# Patient Record
Sex: FEMALE | Race: WHITE | NOT HISPANIC OR LATINO | Employment: OTHER | URBAN - METROPOLITAN AREA
[De-identification: names, ages, dates, MRNs, and addresses within clinical notes are randomized per-mention and may not be internally consistent; named-entity substitution may affect disease eponyms.]

---

## 2019-07-30 ENCOUNTER — OFFICE VISIT (OUTPATIENT)
Dept: URGENT CARE | Facility: CLINIC | Age: 74
End: 2019-07-30
Payer: COMMERCIAL

## 2019-07-30 VITALS
TEMPERATURE: 97 F | HEIGHT: 62 IN | RESPIRATION RATE: 16 BRPM | BODY MASS INDEX: 27.6 KG/M2 | SYSTOLIC BLOOD PRESSURE: 131 MMHG | DIASTOLIC BLOOD PRESSURE: 81 MMHG | OXYGEN SATURATION: 95 % | HEART RATE: 94 BPM | WEIGHT: 150 LBS

## 2019-07-30 DIAGNOSIS — R26.89 NEURODEGENERATIVE GAIT DISORDER: ICD-10-CM

## 2019-07-30 DIAGNOSIS — Z11.1 SCREENING-PULMONARY TB: Primary | ICD-10-CM

## 2019-07-30 PROBLEM — R73.01 IFG (IMPAIRED FASTING GLUCOSE): Status: ACTIVE | Noted: 2018-10-01

## 2019-07-30 PROBLEM — R32 ABSENCE OF BLADDER CONTINENCE: Status: ACTIVE | Noted: 2018-10-01

## 2019-07-30 PROBLEM — G93.40 ENCEPHALOPATHY, UNSPECIFIED: Status: ACTIVE | Noted: 2018-09-24

## 2019-07-30 PROCEDURE — 99213 PR OFFICE/OUTPT VISIT, EST, LEVL III, 20-29 MIN: ICD-10-PCS | Mod: S$GLB,,, | Performed by: FAMILY MEDICINE

## 2019-07-30 PROCEDURE — 71046 XR CHEST PA AND LATERAL: ICD-10-PCS | Mod: FY,S$GLB,, | Performed by: RADIOLOGY

## 2019-07-30 PROCEDURE — 71046 X-RAY EXAM CHEST 2 VIEWS: CPT | Mod: FY,S$GLB,, | Performed by: RADIOLOGY

## 2019-07-30 PROCEDURE — 99213 OFFICE O/P EST LOW 20 MIN: CPT | Mod: S$GLB,,, | Performed by: FAMILY MEDICINE

## 2019-07-30 RX ORDER — VIT C/E/ZN/COPPR/LUTEIN/ZEAXAN 250MG-90MG
CAPSULE ORAL
COMMUNITY
Start: 2017-03-20

## 2019-07-30 RX ORDER — ASPIRIN 81 MG/1
TABLET ORAL
COMMUNITY
Start: 2017-06-19 | End: 2020-03-09

## 2019-07-30 RX ORDER — ATORVASTATIN CALCIUM 40 MG/1
40 TABLET, FILM COATED ORAL
COMMUNITY
Start: 2019-06-20

## 2019-07-30 NOTE — PROGRESS NOTES
"Subjective:       Patient ID: Naya Araujo is a 73 y.o. female.    Vitals:  height is 5' 2" (1.575 m) and weight is 68 kg (150 lb). Her temperature is 97.3 °F (36.3 °C). Her blood pressure is 131/81 and her pulse is 94. Her respiration is 16 and oxygen saturation is 95%.     Chief Complaint: PPD Reading    Pt is here to get a chest xray so she can enter a nursing home on 8/1      Constitution: Negative for chills, fatigue and fever.   HENT: Negative for congestion and sore throat.    Neck: Negative for painful lymph nodes.   Cardiovascular: Negative for chest pain and leg swelling.   Eyes: Negative for double vision and blurred vision.   Respiratory: Negative for cough and shortness of breath.    Gastrointestinal: Negative for nausea, vomiting and diarrhea.   Genitourinary: Negative for dysuria, frequency, urgency and history of kidney stones.   Musculoskeletal: Negative for joint pain, joint swelling, muscle cramps and muscle ache.   Skin: Negative for color change, pale, rash and bruising.   Allergic/Immunologic: Negative for seasonal allergies.   Neurological: Negative for dizziness, history of vertigo, light-headedness, passing out and headaches.   Hematologic/Lymphatic: Negative for swollen lymph nodes.   Psychiatric/Behavioral: Negative for nervous/anxious, sleep disturbance and depression. The patient is not nervous/anxious.        Objective:      Physical Exam   Constitutional: She appears well-developed and well-nourished.   HENT:   Head: Normocephalic and atraumatic.   Cardiovascular: Normal rate, regular rhythm and normal heart sounds.   Pulmonary/Chest: Effort normal and breath sounds normal.   Neurological: She is alert. Coordination normal.   Wide based gait   Skin: Skin is warm and dry.   Psychiatric: She has a normal mood and affect. Her behavior is normal.   Dementia.   Nursing note and vitals reviewed.      Assessment:       1. Screening-pulmonary TB    2. Neurodegenerative gait disorder      "   Plan:         Screening-pulmonary TB  -     XR CHEST PA AND LATERAL; Future; Expected date: 07/30/2019    Neurodegenerative gait disorder

## 2019-12-13 ENCOUNTER — OFFICE VISIT (OUTPATIENT)
Dept: UROGYNECOLOGY | Facility: CLINIC | Age: 74
End: 2019-12-13
Payer: COMMERCIAL

## 2019-12-13 ENCOUNTER — OFFICE VISIT (OUTPATIENT)
Dept: UROLOGY | Facility: CLINIC | Age: 74
End: 2019-12-13
Payer: MEDICARE

## 2019-12-13 VITALS
BODY MASS INDEX: 28.71 KG/M2 | WEIGHT: 156 LBS | SYSTOLIC BLOOD PRESSURE: 110 MMHG | DIASTOLIC BLOOD PRESSURE: 62 MMHG | HEIGHT: 62 IN

## 2019-12-13 VITALS
DIASTOLIC BLOOD PRESSURE: 80 MMHG | SYSTOLIC BLOOD PRESSURE: 163 MMHG | HEIGHT: 62 IN | BODY MASS INDEX: 28.71 KG/M2 | HEART RATE: 98 BPM | WEIGHT: 156 LBS

## 2019-12-13 DIAGNOSIS — R35.0 URINARY FREQUENCY: ICD-10-CM

## 2019-12-13 DIAGNOSIS — N39.41 URGE INCONTINENCE OF URINE: Primary | ICD-10-CM

## 2019-12-13 DIAGNOSIS — N39.41 URGENCY INCONTINENCE: Primary | ICD-10-CM

## 2019-12-13 PROCEDURE — 1159F PR MEDICATION LIST DOCUMENTED IN MEDICAL RECORD: ICD-10-PCS | Mod: S$GLB,,, | Performed by: UROLOGY

## 2019-12-13 PROCEDURE — 1101F PT FALLS ASSESS-DOCD LE1/YR: CPT | Mod: CPTII,S$GLB,, | Performed by: OBSTETRICS & GYNECOLOGY

## 2019-12-13 PROCEDURE — 1101F PR PT FALLS ASSESS DOC 0-1 FALLS W/OUT INJ PAST YR: ICD-10-PCS | Mod: CPTII,S$GLB,, | Performed by: OBSTETRICS & GYNECOLOGY

## 2019-12-13 PROCEDURE — 99212 OFFICE O/P EST SF 10 MIN: CPT | Mod: S$GLB,,, | Performed by: OBSTETRICS & GYNECOLOGY

## 2019-12-13 PROCEDURE — 99999 PR PBB SHADOW E&M-EST. PATIENT-LVL II: ICD-10-PCS | Mod: PBBFAC,,, | Performed by: OBSTETRICS & GYNECOLOGY

## 2019-12-13 PROCEDURE — 99203 OFFICE O/P NEW LOW 30 MIN: CPT | Mod: S$GLB,,, | Performed by: UROLOGY

## 2019-12-13 PROCEDURE — 1159F PR MEDICATION LIST DOCUMENTED IN MEDICAL RECORD: ICD-10-PCS | Mod: S$GLB,,, | Performed by: OBSTETRICS & GYNECOLOGY

## 2019-12-13 PROCEDURE — 1101F PT FALLS ASSESS-DOCD LE1/YR: CPT | Mod: CPTII,S$GLB,, | Performed by: UROLOGY

## 2019-12-13 PROCEDURE — 99203 PR OFFICE/OUTPT VISIT, NEW, LEVL III, 30-44 MIN: ICD-10-PCS | Mod: S$GLB,,, | Performed by: UROLOGY

## 2019-12-13 PROCEDURE — 99999 PR PBB SHADOW E&M-EST. PATIENT-LVL II: CPT | Mod: PBBFAC,,, | Performed by: OBSTETRICS & GYNECOLOGY

## 2019-12-13 PROCEDURE — 1126F PR PAIN SEVERITY QUANTIFIED, NO PAIN PRESENT: ICD-10-PCS | Mod: S$GLB,,, | Performed by: UROLOGY

## 2019-12-13 PROCEDURE — 1126F AMNT PAIN NOTED NONE PRSNT: CPT | Mod: S$GLB,,, | Performed by: UROLOGY

## 2019-12-13 PROCEDURE — 1101F PR PT FALLS ASSESS DOC 0-1 FALLS W/OUT INJ PAST YR: ICD-10-PCS | Mod: CPTII,S$GLB,, | Performed by: UROLOGY

## 2019-12-13 PROCEDURE — 99212 PR OFFICE/OUTPT VISIT, EST, LEVL II, 10-19 MIN: ICD-10-PCS | Mod: S$GLB,,, | Performed by: OBSTETRICS & GYNECOLOGY

## 2019-12-13 PROCEDURE — 1159F MED LIST DOCD IN RCRD: CPT | Mod: S$GLB,,, | Performed by: UROLOGY

## 2019-12-13 PROCEDURE — 1159F MED LIST DOCD IN RCRD: CPT | Mod: S$GLB,,, | Performed by: OBSTETRICS & GYNECOLOGY

## 2019-12-13 NOTE — PROGRESS NOTES
Subjective:      Patient ID: Naya Araujo is a 74 y.o. female.    Chief Complaint:  Consult (re.est care for Interstiem device )      History of Present Illness  This is a patient that I must have about 8-10 years ago placed to InterStim 2 device for the purpose of sacral neuromodulation.  At that.  Since this is the when I was just starting to do this procedure I know that I would always do a 2 stage procedure in that she would have had to have resolving success for me to move forward with battery pack implantation.  At the time that I saw her her  was battling cancer and it was his purpose in life to ensure the well-being of his wife.  The patient's the son is in neuro surgeon now I believe at [facility].  She states that she has been doing very well up until 2 years ago she has been very busy and wants to once again to get the control backed that the implant had given her.  Discussed this at length.  I wrote reveal to the patient that I have not been doing these for close to 7 years and I do not think my skill set is at the point that she needs but I am going to refer her bring her to a colleague who is very proficient with high high skill in this patient appreciated the time    Patient states she has no control of urination has frequent accidents          Past Medical History:   Diagnosis Date    Hyperlipidemia        No past surgical history on file.    GYN & OB History  No LMP recorded.   Date of Last Pap: No result found    OB History   No data available       Health Maintenance       Date Due Completion Date    Hepatitis C Screening 1945 ---    Lipid Panel 1945 ---    TETANUS VACCINE 09/02/1965 ---    Mammogram 09/02/1987 ---    DEXA SCAN 09/02/1987 ---    Shingles Vaccine (1 of 2) 09/02/1997 ---    Colonoscopy 09/02/1997 ---    Pneumococcal Vaccine (65+ Low/Medium Risk) (1 of 2 - PCV13) 09/02/2012 ---    Influenza Vaccine (1) 09/01/2019 ---          No family history on file.    Social History  "    Socioeconomic History    Marital status:      Spouse name: Not on file    Number of children: Not on file    Years of education: Not on file    Highest education level: Not on file   Occupational History    Not on file   Social Needs    Financial resource strain: Not on file    Food insecurity:     Worry: Not on file     Inability: Not on file    Transportation needs:     Medical: Not on file     Non-medical: Not on file   Tobacco Use    Smoking status: Never Smoker    Smokeless tobacco: Never Used   Substance and Sexual Activity    Alcohol use: Yes    Drug use: Never    Sexual activity: Not on file   Lifestyle    Physical activity:     Days per week: Not on file     Minutes per session: Not on file    Stress: Not on file   Relationships    Social connections:     Talks on phone: Not on file     Gets together: Not on file     Attends Anabaptist service: Not on file     Active member of club or organization: Not on file     Attends meetings of clubs or organizations: Not on file     Relationship status: Not on file   Other Topics Concern    Not on file   Social History Narrative    Not on file       Review of Systems  Review of Systems  Profound incontinence and frequency     Objective:   /62 (BP Location: Right arm, Patient Position: Sitting)   Ht 5' 2" (1.575 m)   Wt 70.8 kg (156 lb)   BMI 28.53 kg/m²     Physical Exam   The deferred  Assessment:     1. Urge incontinence of urine    2. Urinary frequency            Plan:     1. Urge incontinence of urine    2. Urinary frequency      I am referring this patient to the Urology for most likely either battery pack replacement or lead replacement or both.  I am going to walk the patient upstairs myself present the patient to the provider as well.  There are no Patient Instructions on file for this visit.      "

## 2019-12-13 NOTE — LETTER
December 13, 2019      Chandler Feldman MD  [address] [place] [address]  [address]  [facility] [address]           [facility] [address] [address]  Phone: [phone]  Fax: [phone]          Patient: Naya Araujo   MR Number: [id number]   YOB: 1945   Date of Visit: 12/13/2019       Dear Dr. Chandler Feldman:    Thank you for referring Naya Araujo to me for evaluation. Attached you will find relevant portions of my assessment and plan of care.    If you have questions, please do not hesitate to call me. I look forward to following Naya Araujo along with you.    Sincerely,    John Traore MD    Enclosure  CC:  No Recipients    If you would like to receive this communication electronically, please contact [email or web address] or [phone] to request more information on [employer] Link access.    For providers and/or their staff who would like to refer a patient to [facility], please contact us through our one-stop-shop provider referral line, [facility], at [phone].    If you feel you have received this communication in error or would no longer like to receive these types of communications, please e-mail [email or web address]

## 2019-12-13 NOTE — PROGRESS NOTES
New Overactive Bladder Note      Subjective:         Chief Complaint:  Non-functioning SNM Device; Referred by Dr. Feldman        History of Present Illness  This is a very pleasant 74 year-old woman referred by Dr. Feldman for a non-functioning SNM device implanted about 9-10 years ago. This was placed in a staged fashion and she reports considerable improvement in her symptoms until about 2 years ago, when she had return of symptoms. She has not been able to communicate with the device.     She now reports voiding 4-5x daily and 1-2x at night. However, she reports at least 4 UUI episodes daily with use of 4 pads per day which are heavily saturated. She used only one pad daily prior to 1-2 years ago.                    Past Medical History:   Diagnosis Date    Hyperlipidemia           No past surgical history on file.     GYN & OB History  No LMP recorded.   Date of Last Pap: No result found     OB History   No data available                Health Maintenance        Date Due Completion Date     Hepatitis C Screening 1945 ---     Lipid Panel 1945 ---     TETANUS VACCINE 09/02/1965 ---     Mammogram 09/02/1987 ---     DEXA SCAN 09/02/1987 ---     Shingles Vaccine (1 of 2) 09/02/1997 ---     Colonoscopy 09/02/1997 ---     Pneumococcal Vaccine (65+ Low/Medium Risk) (1 of 2 - PCV13) 09/02/2012 ---     Influenza Vaccine (1) 09/01/2019 ---             No family history on file.     Social History            Socioeconomic History    Marital status:        Spouse name: Not on file    Number of children: Not on file    Years of education: Not on file    Highest education level: Not on file   Occupational History    Not on file   Social Needs    Financial resource strain: Not on file    Food insecurity:       Worry: Not on file       Inability: Not on file    Transportation needs:       Medical: Not on file       Non-medical: Not on file   Tobacco Use    Smoking status: Never Smoker     "Smokeless tobacco: Never Used   Substance and Sexual Activity    Alcohol use: Yes    Drug use: Never    Sexual activity: Not on file   Lifestyle    Physical activity:       Days per week: Not on file       Minutes per session: Not on file    Stress: Not on file   Relationships    Social connections:       Talks on phone: Not on file       Gets together: Not on file       Attends Spiritism service: Not on file       Active member of club or organization: Not on file       Attends meetings of clubs or organizations: Not on file       Relationship status: Not on file   Other Topics Concern    Not on file   Social History Narrative    Not on file         Review of Systems  Review of Systems  Profound incontinence and frequency     Objective:   /62 (BP Location: Right arm, Patient Position: Sitting)   Ht 5' 2" (1.575 m)   Wt 70.8 kg (156 lb)   BMI 28.53 kg/m²      Physical Exam      Const: no acute distress, conversant and alert  Eyes: anicteric, extraocular muscles intact  ENMT: normocephalic, Nl oral membranes  Cardio: no cyanosis, nl cap refill  Pulm: no tachypnea; no resp distress  Abd: soft, no tenderness  Musc: no laceration, no tenderness  Neuro: alert; oriented x 3  Skin: warm, dry; no petichiae  Psych: no anxiety; normal speech         Assessment:      1. Urgency Incontinence: Patient brought up by Dr. Feldman today who reviewed her history and patient was able to fill me in on details of her current symptoms. She did not have her  with her today but reports no sensation and not being able to communicate with her device. Given the device was placed nearly 10 years ago, would suspect that her IPG has fully depleted. Would begin with IPG replacement as we can do this under a local anesthetic and she had adequate response for 8 years after placment.       "

## 2020-01-13 ENCOUNTER — TELEPHONE (OUTPATIENT)
Dept: UROGYNECOLOGY | Facility: CLINIC | Age: 75
End: 2020-01-13

## 2020-01-13 NOTE — TELEPHONE ENCOUNTER
----- Message from Jackie Dawkins sent at 1/13/2020  9:20 AM CST -----  Contact: VICKY FRANK [[id number]]  Contact: VICKY FRANK [[id number]]    What is the request in detail:   Requesting a call in regards to a referral the doctor he wanted her to see she states she needs the name and office # for the clinic      Can the clinic reply by [facility]:  No      What Number to Call Back if not in [facility]:   [phone]

## 2020-01-23 ENCOUNTER — TELEPHONE (OUTPATIENT)
Dept: UROGYNECOLOGY | Facility: CLINIC | Age: 75
End: 2020-01-23

## 2020-01-23 NOTE — TELEPHONE ENCOUNTER
----- Message from Radha Vasquez sent at 1/23/2020  1:28 PM CST -----  Contact: VICKY FRANK [[id number]]  Name of Who is Calling : VICKY FRANK [[id number]]    Patient is requesting a call from staff in regards to referral to a provider she was given by dr. gomez    .....Please contact to further discuss and advise.    Can the clinic reply by [facility] : No    What Number to Call Back :  [phone]

## 2020-01-23 NOTE — TELEPHONE ENCOUNTER
Spoke to pt. And provided her with Dr. John Traore's name and #[phone].pt. Verbalized understanding

## 2020-01-24 ENCOUNTER — TELEPHONE (OUTPATIENT)
Dept: UROGYNECOLOGY | Facility: CLINIC | Age: 75
End: 2020-01-24

## 2020-01-24 NOTE — TELEPHONE ENCOUNTER
----- Message from Alise Patton sent at 1/24/2020 12:53 PM CST -----  Contact: VICKY FRANK   Name of Who is Calling: VICKY FRANK       What is the request in detail: patient is requesting a call back concerning a doctor he referred her to she has some questions       Can the clinic reply by [facility]: no      What Number to Call Back if not in [facility]: [phone]

## 2020-02-13 ENCOUNTER — TELEPHONE (OUTPATIENT)
Dept: UROGYNECOLOGY | Facility: CLINIC | Age: 75
End: 2020-02-13

## 2020-02-13 NOTE — TELEPHONE ENCOUNTER
----- Message from Gallo Arauz, Patient Care Assistant sent at 2/13/2020 11:08 AM CST -----  Contact: Tawnya Director of assisting living  Name of Who is Calling: Tawnya Director of assisting living    What is the request in detail:Requesting a call back in regards of a procedure stating patient had it done years ago and wants to repair device for pelvic stimulator or have another procedure done .  Please contact to further discuss and advise      Can the clinic reply by [facility]: No    What [facility]:   [phone]

## 2020-02-14 ENCOUNTER — TELEPHONE (OUTPATIENT)
Dept: UROGYNECOLOGY | Facility: CLINIC | Age: 75
End: 2020-02-14

## 2020-02-14 NOTE — TELEPHONE ENCOUNTER
----- Message from Shaila Sapp sent at 2/14/2020  9:42 AM CST -----  Contact: Rosa M with Assisting Living  Name of Who is Calling: Rosa M with Assisting Living    What is the request in detail: states this is her second attempt contacting office in regards to  Requesting a call back in regards of a procedure stating patient had it done years ago and wants to repair device for pelvic stimulator or have another procedure done .                             Please contact to further discuss and advise      Can the clinic reply by [facility]: no    What [facility] to [facility] Back [facility]: [phone]

## 2020-03-09 ENCOUNTER — TELEPHONE (OUTPATIENT)
Dept: UROLOGY | Facility: CLINIC | Age: 75
End: 2020-03-09

## 2020-03-09 ENCOUNTER — OFFICE VISIT (OUTPATIENT)
Dept: UROLOGY | Facility: CLINIC | Age: 75
End: 2020-03-09
Payer: MEDICARE

## 2020-03-09 VITALS
SYSTOLIC BLOOD PRESSURE: 141 MMHG | HEART RATE: 79 BPM | DIASTOLIC BLOOD PRESSURE: 82 MMHG | BODY MASS INDEX: 28.71 KG/M2 | HEIGHT: 62 IN | WEIGHT: 156 LBS

## 2020-03-09 DIAGNOSIS — N39.41 URGENCY INCONTINENCE: Primary | ICD-10-CM

## 2020-03-09 DIAGNOSIS — N30.00 ACUTE CYSTITIS WITHOUT HEMATURIA: Primary | ICD-10-CM

## 2020-03-09 PROCEDURE — 1159F MED LIST DOCD IN RCRD: CPT | Mod: S$GLB,,, | Performed by: UROLOGY

## 2020-03-09 PROCEDURE — 1101F PT FALLS ASSESS-DOCD LE1/YR: CPT | Mod: CPTII,S$GLB,, | Performed by: UROLOGY

## 2020-03-09 PROCEDURE — 1101F PR PT FALLS ASSESS DOC 0-1 FALLS W/OUT INJ PAST YR: ICD-10-PCS | Mod: CPTII,S$GLB,, | Performed by: UROLOGY

## 2020-03-09 PROCEDURE — 99999 PR PBB SHADOW E&M-EST. PATIENT-LVL III: ICD-10-PCS | Mod: PBBFAC,,, | Performed by: UROLOGY

## 2020-03-09 PROCEDURE — 87081 CULTURE SCREEN ONLY: CPT | Mod: 59

## 2020-03-09 PROCEDURE — 87186 SC STD MICRODIL/AGAR DIL: CPT | Mod: 59

## 2020-03-09 PROCEDURE — 1126F PR PAIN SEVERITY QUANTIFIED, NO PAIN PRESENT: ICD-10-PCS | Mod: S$GLB,,, | Performed by: UROLOGY

## 2020-03-09 PROCEDURE — 99214 OFFICE O/P EST MOD 30 MIN: CPT | Mod: S$GLB,,, | Performed by: UROLOGY

## 2020-03-09 PROCEDURE — 87088 URINE BACTERIA CULTURE: CPT

## 2020-03-09 PROCEDURE — 1126F AMNT PAIN NOTED NONE PRSNT: CPT | Mod: S$GLB,,, | Performed by: UROLOGY

## 2020-03-09 PROCEDURE — 87086 URINE CULTURE/COLONY COUNT: CPT

## 2020-03-09 PROCEDURE — 87077 CULTURE AEROBIC IDENTIFY: CPT | Mod: 59

## 2020-03-09 PROCEDURE — 99999 PR PBB SHADOW E&M-EST. PATIENT-LVL III: CPT | Mod: PBBFAC,,, | Performed by: UROLOGY

## 2020-03-09 PROCEDURE — 99214 PR OFFICE/OUTPT VISIT, EST, LEVL IV, 30-39 MIN: ICD-10-PCS | Mod: S$GLB,,, | Performed by: UROLOGY

## 2020-03-09 PROCEDURE — 1159F PR MEDICATION LIST DOCUMENTED IN MEDICAL RECORD: ICD-10-PCS | Mod: S$GLB,,, | Performed by: UROLOGY

## 2020-03-09 RX ORDER — SULFAMETHOXAZOLE AND TRIMETHOPRIM 800; 160 MG/1; MG/1
1 TABLET ORAL 2 TIMES DAILY
Qty: 6 TABLET | Refills: 0 | Status: SHIPPED | OUTPATIENT
Start: 2020-03-09 | End: 2020-03-12

## 2020-03-09 NOTE — LETTER
March 9, 2020      Yaz Anglin MD  [address]   [address]  [facility] [address]           [facility] - [facility] 4th Floor  [address]  [place] [address]  Phone: [phone]          Patient: Naya Araujo   MR Number: [id number]   YOB: 1945   Date of Visit: 3/9/2020       Dear Dr. Yaz Anglin:    Thank you for referring Naya Araujo to me for evaluation. Attached you will find relevant portions of my assessment and plan of care.    If you have questions, please do not hesitate to call me. I look forward to following Naya Araujo along with you.    Sincerely,    John Traore MD    Enclosure  CC:  No Recipients    If you would like to receive this communication electronically, please contact [email or web address] or [phone] to request more information on [employer] Link access.    For providers and/or their staff who would like to refer a patient to [facility], please contact us through our one-stop-shop provider referral line, [facility], at [phone].    If you feel you have received this communication in error or would no longer like to receive these types of communications, please e-mail [email or web address]

## 2020-03-09 NOTE — PROGRESS NOTES
New Overactive Bladder Note        Subjective:         Chief Complaint:  Non-functioning SNM Device; Referred by Dr. Feldman        History of Present Illness  This is a very pleasant 74 year-old woman referred by Dr. Feldman for a non-functioning SNM device implanted about 9-10 years ago. This was placed in a staged fashion and she reports considerable improvement in her symptoms until about 2 years ago, when she had return of symptoms. She has not been able to communicate with the device.      She now reports voiding 4-5x daily and 1-2x at night. However, she reports at least 4 UUI episodes daily with use of 4 pads per day which are heavily saturated. She used only one pad daily prior to 1-2 years ago.                       Past Medical History:   Diagnosis Date    Hyperlipidemia           No past surgical history on file.     GYN & OB History  No LMP recorded.   Date of Last Pap: No result found     OB History   No data available                     Health Maintenance        Date Due Completion Date      Hepatitis C Screening 1945 ---      Lipid Panel 1945 ---      TETANUS VACCINE 09/02/1965 ---      Mammogram 09/02/1987 ---      DEXA SCAN 09/02/1987 ---      Shingles Vaccine (1 of 2) 09/02/1997 ---      Colonoscopy 09/02/1997 ---      Pneumococcal Vaccine (65+ Low/Medium Risk) (1 of 2 - PCV13) 09/02/2012 ---      Influenza Vaccine (1) 09/01/2019 ---              No family history on file.     Social History                Socioeconomic History    Marital status:        Spouse name: Not on file    Number of children: Not on file    Years of education: Not on file    Highest education level: Not on file   Occupational History    Not on file   Social Needs    Financial resource strain: Not on file    Food insecurity:       Worry: Not on file       Inability: Not on file    Transportation needs:       Medical: Not on file       Non-medical: Not on file   Tobacco Use    Smoking  status: Never Smoker    Smokeless tobacco: Never Used   Substance and Sexual Activity    Alcohol use: Yes    Drug use: Never    Sexual activity: Not on file   Lifestyle    Physical activity:       Days per week: Not on file       Minutes per session: Not on file    Stress: Not on file   Relationships    Social connections:       Talks on phone: Not on file       Gets together: Not on file       Attends Spiritism service: Not on file       Active member of club or organization: Not on file       Attends meetings of clubs or organizations: Not on file       Relationship status: Not on file   Other Topics Concern    Not on file   Social History Narrative    Not on file         Review of Systems  Review of Systems  Profound incontinence and frequency     Physical Exam        Const: no acute distress, conversant and alert  Eyes: anicteric, extraocular muscles intact  ENMT: normocephalic, Nl oral membranes  Cardio: no cyanosis, nl cap refill  Pulm: no tachypnea; no resp distress  Abd: soft, no tenderness  Musc: no laceration, no tenderness  Neuro: alert; oriented x 3  Skin: warm, dry; no petichiae  Psych: no anxiety; normal speech            Assessment:      1. Urgency Incontinence: Patient brought up by Dr. Feldman today who reviewed her history and patient was able to fill me in on details of her current symptoms. She did not have her  with her today but reports no sensation and not being able to communicate with her device. Given the device was placed nearly 10 years ago, would suspect that her IPG has fully depleted. Would begin with IPG replacement as we can do this under a local anesthetic and she had adequate response for 8 years after placment.

## 2020-03-12 LAB
BACTERIA UR CULT: ABNORMAL
BACTERIA UR CULT: ABNORMAL
MRSA SPEC QL CULT: NORMAL

## 2020-09-17 ENCOUNTER — TELEPHONE (OUTPATIENT)
Dept: UROLOGY | Facility: CLINIC | Age: 75
End: 2020-09-17

## 2020-09-17 DIAGNOSIS — N32.81 OAB (OVERACTIVE BLADDER): ICD-10-CM

## 2020-09-17 DIAGNOSIS — N39.41 URGENCY INCONTINENCE: Primary | ICD-10-CM

## 2020-09-17 NOTE — TELEPHONE ENCOUNTER
Spoke with pt son carri to reschedule her procedure, offered 9-30 but the pt son [place] be [place] from [place] and he's unable to make it in until the end of oct. Per pt son request I will be mailing out the instructions and covid screening appointment letter to address given in [place], pt son agreed and verbally understood.

## 2020-10-19 ENCOUNTER — TELEPHONE (OUTPATIENT)
Dept: UROLOGY | Facility: CLINIC | Age: 75
End: 2020-10-19

## 2020-10-19 NOTE — ANESTHESIA PAT ROS NOTE
10/19/2020  Naya Araujo is a 75 y.o., female.      Pre-op Assessment          Review of Systems  Anesthesia Hx:  No problems with previous Anesthesia  Denies Family Hx of Anesthesia complications.   Denies Personal Hx of Anesthesia complications.   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      Shani Ga RN  10/19/20    Anesthesia Assessment: Preoperative EQUATION    Planned Procedure: Procedure(s) (LRB):  REVISION, NEUROSTIMULATOR, SACRAL (N/A)  INSERTION, NEUROSTIMULATOR, PERMANENT, SACRAL (N/A)  Requested Anesthesia Type:General  Surgeon: John Traore MD  Service: Urology  Known or anticipated Date of Surgery:11/10/2020    Surgeon notes: reviewed and Urgency incontinence    Previous anesthesia records:Not available and S/P Hysterectomy-No anesthesia records found in [facility]     Last PCP note: > 1 year ago , outside [facility] , [facility]-X[facility] mo/F/U-[facility].  Subspecialty notes: Urogynecology    Other important co-morbidities: none    Medical History    Diagnosis Date Comment Source   Hyperlipidemia   Provider     Patients' son states patient had Encephalitis in past history.    Tests already available:  Results have been reviewed. CXR-7/30/19      Plan: Phone pending     Testing:  BMP and EKG     Patient  has previously scheduled Medical Appointment:Appointment on 10/24/20, prior to the surgery date.    Navigation: Phone Completed                       Tests Scheduled. Lab-BMP & EKG on 11/6/20 @ 1:15p & 1:40p-Pending--(Updated appt. on 11/4/20).          Consults scheduled. None needed at this time.             Results will be tracked by Preop  "Clinic.    Patient has difficulty doing phone screen-hearing difficulty and her  son -(Dr.P. Araujo) did the preop phone screen.                   Shani Ga RN  10/19/20      Addendum:Patient missed Lab & EKG appt. on 11/3/20 & rescheduled for 11/6/20 @ 1:15p & 1:40p(patients' son-() requesting Friday-11/6/20 appts.)-pending-(reordered BMP & EKG)-pending. Shani Ga RN  11/4/2    Addendum:BMP-done & reviewed on 11/6/20 & EKG done 11/6/20  & "in process"-will need review-pending. Shani Ga RN 11/6/20    Addendum: EKG done on 11/6/20, & reviewed. Shani Ga RN  11/9/20             "

## 2020-10-19 NOTE — TELEPHONE ENCOUNTER
Spoke to patients son carri to advise the pt procedure would have to be rescheduled due to dr cruz being out. The pt son agreed on 11-10, her covid screening would also be rescheduled and the new appt letter will be sent out to the address her son gave me. Pt son verbally understood.

## 2020-11-06 ENCOUNTER — HOSPITAL ENCOUNTER (OUTPATIENT)
Dept: CARDIOLOGY | Facility: CLINIC | Age: 75
Discharge: HOME | End: 2020-11-06
Payer: MEDICARE

## 2020-11-06 DIAGNOSIS — Z01.818 PREOP TESTING: ICD-10-CM

## 2020-11-06 PROCEDURE — 93010 ELECTROCARDIOGRAM REPORT: CPT | Mod: S$GLB,,, | Performed by: INTERNAL MEDICINE

## 2020-11-06 PROCEDURE — 93005 ELECTROCARDIOGRAM TRACING: CPT | Mod: S$GLB,,, | Performed by: ANESTHESIOLOGY

## 2020-11-06 PROCEDURE — 93010 EKG 12-LEAD: ICD-10-PCS | Mod: S$GLB,,, | Performed by: INTERNAL MEDICINE

## 2020-11-06 PROCEDURE — 93005 EKG 12-LEAD: ICD-10-PCS | Mod: S$GLB,,, | Performed by: ANESTHESIOLOGY

## 2020-11-07 ENCOUNTER — LAB VISIT (OUTPATIENT)
Dept: URGENT CARE | Facility: CLINIC | Age: 75
End: 2020-11-07
Payer: MEDICARE

## 2020-11-07 DIAGNOSIS — N32.81 OAB (OVERACTIVE BLADDER): ICD-10-CM

## 2020-11-07 DIAGNOSIS — N39.41 URGENCY INCONTINENCE: ICD-10-CM

## 2020-11-07 PROCEDURE — U0003 INFECTIOUS AGENT DETECTION BY NUCLEIC ACID (DNA OR RNA); SEVERE ACUTE RESPIRATORY SYNDROME CORONAVIRUS 2 (SARS-COV-2) (CORONAVIRUS DISEASE [COVID-19]), AMPLIFIED PROBE TECHNIQUE, MAKING USE OF HIGH THROUGHPUT TECHNOLOGIES AS DESCRIBED BY CMS-2020-01-R: HCPCS

## 2020-11-08 LAB — SARS-COV-2 RNA RESP QL NAA+PROBE: NOT DETECTED

## 2020-11-09 ENCOUNTER — TELEPHONE (OUTPATIENT)
Dept: UROLOGY | Facility: CLINIC | Age: 75
End: 2020-11-09

## 2020-11-09 ENCOUNTER — ANESTHESIA EVENT (OUTPATIENT)
Dept: SURGERY | Facility: HOSPITAL | Age: 75
End: 2020-11-09
Payer: MEDICARE

## 2020-11-09 PROBLEM — N32.81 OVERACTIVE BLADDER: Status: ACTIVE | Noted: 2020-11-09

## 2020-11-09 NOTE — TELEPHONE ENCOUNTER
Called pt and spoke with her son to confirm arrival time of 500am for procedure on 11-10. Gave pt NPO instructions and gave pt opportunity to ask questions. Pt verbalized understanding.    Pt son was informed that only 1 person would be allowed to accompany them the morning of surgery.  Pt son verbalized understanding.

## 2020-11-10 ENCOUNTER — ANESTHESIA (OUTPATIENT)
Dept: SURGERY | Facility: HOSPITAL | Age: 75
End: 2020-11-10
Payer: MEDICARE

## 2020-11-10 ENCOUNTER — HOSPITAL ENCOUNTER (OUTPATIENT)
Facility: HOSPITAL | Age: 75
Discharge: HOME | End: 2020-11-10
Attending: UROLOGY | Admitting: UROLOGY
Payer: MEDICARE

## 2020-11-10 VITALS
SYSTOLIC BLOOD PRESSURE: 150 MMHG | OXYGEN SATURATION: 98 % | TEMPERATURE: 99 F | WEIGHT: 160 LBS | RESPIRATION RATE: 17 BRPM | HEART RATE: 81 BPM | BODY MASS INDEX: 30.21 KG/M2 | DIASTOLIC BLOOD PRESSURE: 69 MMHG | HEIGHT: 61 IN

## 2020-11-10 DIAGNOSIS — N32.81 OVERACTIVE BLADDER: Primary | ICD-10-CM

## 2020-11-10 PROCEDURE — D9220A PRA ANESTHESIA: Mod: CRNA,,, | Performed by: NURSE ANESTHETIST, CERTIFIED REGISTERED

## 2020-11-10 PROCEDURE — 37000008 HC ANESTHESIA 1ST 15 MINUTES: Performed by: UROLOGY

## 2020-11-10 PROCEDURE — 25000003 PHARM REV CODE 250: Performed by: NURSE ANESTHETIST, CERTIFIED REGISTERED

## 2020-11-10 PROCEDURE — 71000015 HC POSTOP RECOV 1ST HR: Performed by: UROLOGY

## 2020-11-10 PROCEDURE — C1767 GENERATOR, NEURO NON-RECHARG: HCPCS | Performed by: UROLOGY

## 2020-11-10 PROCEDURE — 37000009 HC ANESTHESIA EA ADD 15 MINS: Performed by: UROLOGY

## 2020-11-10 PROCEDURE — 95972 PR PRG SPNL GEN CMPLX: ICD-10-PCS | Mod: 59,,, | Performed by: UROLOGY

## 2020-11-10 PROCEDURE — 64590 PR IMPLANT PERIPH/GASTRIC NEUROSTIM/RECEIVER: ICD-10-PCS | Mod: ,,, | Performed by: UROLOGY

## 2020-11-10 PROCEDURE — D9220A PRA ANESTHESIA: ICD-10-PCS | Mod: CRNA,,, | Performed by: NURSE ANESTHETIST, CERTIFIED REGISTERED

## 2020-11-10 PROCEDURE — D9220A PRA ANESTHESIA: ICD-10-PCS | Mod: ANES,,, | Performed by: ANESTHESIOLOGY

## 2020-11-10 PROCEDURE — 63600175 PHARM REV CODE 636 W HCPCS: Performed by: NURSE ANESTHETIST, CERTIFIED REGISTERED

## 2020-11-10 PROCEDURE — 71000044 HC DOSC ROUTINE RECOVERY FIRST HOUR: Performed by: UROLOGY

## 2020-11-10 PROCEDURE — 64590 INS/RPL PRPH SAC/GSTR NPG/R: CPT | Mod: ,,, | Performed by: UROLOGY

## 2020-11-10 PROCEDURE — 95972 ALYS CPLX SP/PN NPGT W/PRGRM: CPT | Mod: 59,,, | Performed by: UROLOGY

## 2020-11-10 PROCEDURE — 36000707: Performed by: UROLOGY

## 2020-11-10 PROCEDURE — D9220A PRA ANESTHESIA: Mod: ANES,,, | Performed by: ANESTHESIOLOGY

## 2020-11-10 PROCEDURE — 25000003 PHARM REV CODE 250: Performed by: UROLOGY

## 2020-11-10 PROCEDURE — 25000003 PHARM REV CODE 250: Performed by: STUDENT IN AN ORGANIZED HEALTH CARE EDUCATION/TRAINING PROGRAM

## 2020-11-10 PROCEDURE — 36000706: Performed by: UROLOGY

## 2020-11-10 PROCEDURE — 63600175 PHARM REV CODE 636 W HCPCS: Performed by: STUDENT IN AN ORGANIZED HEALTH CARE EDUCATION/TRAINING PROGRAM

## 2020-11-10 DEVICE — SYS INTERSTIM X RECHARGE FREE: Type: IMPLANTABLE DEVICE | Site: SACRUM | Status: FUNCTIONAL

## 2020-11-10 RX ORDER — MIDAZOLAM HYDROCHLORIDE 1 MG/ML
INJECTION, SOLUTION INTRAMUSCULAR; INTRAVENOUS
Status: DISCONTINUED | OUTPATIENT
Start: 2020-11-10 | End: 2020-11-10

## 2020-11-10 RX ORDER — OXYCODONE AND ACETAMINOPHEN 5; 325 MG/1; MG/1
1 TABLET ORAL EVERY 6 HOURS PRN
Qty: 5 TABLET | Refills: 0 | Status: SHIPPED | OUTPATIENT
Start: 2020-11-10

## 2020-11-10 RX ORDER — VANCOMYCIN HCL IN 5 % DEXTROSE 1G/250ML
15 PLASTIC BAG, INJECTION (ML) INTRAVENOUS
Status: COMPLETED | OUTPATIENT
Start: 2020-11-10 | End: 2020-11-10

## 2020-11-10 RX ORDER — SODIUM CHLORIDE 9 MG/ML
INJECTION, SOLUTION INTRAVENOUS CONTINUOUS
Status: DISCONTINUED | OUTPATIENT
Start: 2020-11-10 | End: 2020-11-10 | Stop reason: HOSPADM

## 2020-11-10 RX ORDER — PROPOFOL 10 MG/ML
VIAL (ML) INTRAVENOUS CONTINUOUS PRN
Status: DISCONTINUED | OUTPATIENT
Start: 2020-11-10 | End: 2020-11-10

## 2020-11-10 RX ORDER — SODIUM CHLORIDE 0.9 % (FLUSH) 0.9 %
3 SYRINGE (ML) INJECTION
Status: DISCONTINUED | OUTPATIENT
Start: 2020-11-10 | End: 2020-11-10 | Stop reason: HOSPADM

## 2020-11-10 RX ORDER — FENTANYL CITRATE 50 UG/ML
50 INJECTION, SOLUTION INTRAMUSCULAR; INTRAVENOUS EVERY 5 MIN PRN
Status: DISCONTINUED | OUTPATIENT
Start: 2020-11-10 | End: 2020-11-10 | Stop reason: HOSPADM

## 2020-11-10 RX ORDER — FENTANYL CITRATE 50 UG/ML
INJECTION, SOLUTION INTRAMUSCULAR; INTRAVENOUS
Status: DISCONTINUED | OUTPATIENT
Start: 2020-11-10 | End: 2020-11-10

## 2020-11-10 RX ORDER — HYDROMORPHONE HYDROCHLORIDE 1 MG/ML
0.2 INJECTION, SOLUTION INTRAMUSCULAR; INTRAVENOUS; SUBCUTANEOUS EVERY 5 MIN PRN
Status: DISCONTINUED | OUTPATIENT
Start: 2020-11-10 | End: 2020-11-10 | Stop reason: HOSPADM

## 2020-11-10 RX ORDER — SODIUM CHLORIDE 9 MG/ML
INJECTION, SOLUTION INTRAVENOUS CONTINUOUS PRN
Status: DISCONTINUED | OUTPATIENT
Start: 2020-11-10 | End: 2020-11-10

## 2020-11-10 RX ORDER — BUPIVACAINE HYDROCHLORIDE AND EPINEPHRINE 2.5; 5 MG/ML; UG/ML
INJECTION, SOLUTION EPIDURAL; INFILTRATION; INTRACAUDAL; PERINEURAL
Status: DISCONTINUED | OUTPATIENT
Start: 2020-11-10 | End: 2020-11-10 | Stop reason: HOSPADM

## 2020-11-10 RX ADMIN — MIDAZOLAM HYDROCHLORIDE 1 MG: 1 INJECTION, SOLUTION INTRAMUSCULAR; INTRAVENOUS at 06:11

## 2020-11-10 RX ADMIN — MIDAZOLAM HYDROCHLORIDE 0.5 MG: 1 INJECTION, SOLUTION INTRAMUSCULAR; INTRAVENOUS at 07:11

## 2020-11-10 RX ADMIN — PROPOFOL 100 MCG/KG/MIN: 10 INJECTION, EMULSION INTRAVENOUS at 07:11

## 2020-11-10 RX ADMIN — FENTANYL CITRATE 25 MCG: 50 INJECTION, SOLUTION INTRAMUSCULAR; INTRAVENOUS at 07:11

## 2020-11-10 RX ADMIN — SODIUM CHLORIDE: 9 INJECTION, SOLUTION INTRAVENOUS at 07:11

## 2020-11-10 RX ADMIN — VANCOMYCIN HYDROCHLORIDE 1000 MG: 1 INJECTION, POWDER, LYOPHILIZED, FOR SOLUTION INTRAVENOUS at 06:11

## 2020-11-10 NOTE — H&P
Urology ([facility]) H&P  Staff: John Traore MD    HPI:  Naya Araujo is a 75 y.o. female with overactive bladder.    She has a non-functioning SNM device implanted about 10-11 years ago. This was placed in a staged fashion and she reports considerable improvement in her symptoms until about 3 years ago, when she had return of symptoms. She has not been able to communicate with the device.      She now reports voiding 4-5x daily and 1-2x at night. However, she reports at least 4 UUI episodes daily with use of 4 pads per day which are heavily saturated. She used only one pad daily prior to 2-3 years ago.    ROS:  Neg except per HPI    Past Medical History:   Diagnosis Date    Hyperlipidemia        Past Surgical History:   Procedure Laterality Date    HYSTERECTOMY         Social History     Socioeconomic History    Marital status:      Spouse name: Not on file    Number of children: Not on file    Years of education: Not on file    Highest education level: Not on file   Occupational History    Not on file   Social Needs    Financial resource strain: Not on file    Food insecurity     Worry: Not on file     Inability: Not on file    Transportation needs     Medical: Not on file     Non-medical: Not on file   Tobacco Use    Smoking status: Never Smoker    Smokeless tobacco: Never Used   Substance and Sexual Activity    Alcohol use: Yes     Comment: infrequent    Drug use: Never    Sexual activity: Not on file   Lifestyle    Physical activity     Days per week: Not on file     Minutes per session: Not on file    Stress: Not on file   Relationships    Social connections     Talks on phone: Not on file     Gets together: Not on file     Attends [employer] service: Not on file     Active member of club or organization: Not on file     Attends meetings of clubs or organizations: Not on file     Relationship status: Not on file   Other Topics Concern    Not on file   Social History Narrative     Not on file       Family History   Problem Relation Age of Onset    Anesthesia problems Neg Hx        Review of patient's allergies indicates:  No Known Allergies    No current facility-administered medications on file prior to encounter.      Current Outpatient Medications on File Prior to Encounter   Medication Sig Dispense Refill    atorvastatin (LIPITOR) 40 MG tablet Take 40 mg by mouth.      cholecalciferol, vitamin D3, (VITAMIN D3) 1,000 unit capsule Vitamin D 1000 UNT Oral Tablet         Anticoagulation:  No    Physical Exam:  General: No acute distress, well developed. AAOx3  Head: Normocephalic, Atraumatic  Eyes: Extra-occular movements intact, No discharge  Neck: supple, symmetrical, trachea midline  Lungs: normal respiratory effort, no respiratory distress, no wheezes  CV: regular rate, 2+ pulses  Abdomen: soft, non-tender, non-distended, no organomegaly  MSK: no edema, no deformities, normal ROM  Skin: skin color, texture, turgor normal.  Neurologic: no focal deficits, sensation intact    Labs:    No results found for: WBC, HGB, HCT, MCV, PLT        BMP  Lab Results   Component Value Date     11/06/2020    K 3.8 11/06/2020     11/06/2020    CO2 24 11/06/2020    BUN 19 11/06/2020    CREATININE 0.9 11/06/2020    CALCIUM 9.5 11/06/2020    ANIONGAP 9 11/06/2020    ESTGFRAFRICA >60.0 11/06/2020    EGFRNONAA >60.0 11/06/2020     Imaging:  N/A    Assessment: Naya Araujo is a 75 y.o. female with overactive bladder.    Plan:     1. To OR today for InterStim IPG replacement.  2. Surgical consent signed.  3. I have explained the risk, benefits, and alternatives of the procedure in detail. The patient voices understanding and all questions have been answered. The patient agrees to proceed as planned.     Trevor Jasmine MD

## 2020-11-10 NOTE — PLAN OF CARE
Pt tolerated procedure well.  Discharge paperwork printed and reviewed with pt and son.  Copies of discharge paperwork printed and given to patient.  No complaints of pain or nausea.  Pt tolerating PO liquids well.  VSS.  IV removed.  Safety maintained throughout care.

## 2020-11-10 NOTE — DISCHARGE SUMMARY
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       Discharged Condition: good    Hospital Course: Patient was admitted for InterStim IPG replacement and tolerated the procedure well with no complications. The patient was discharged home in good condition on the same day.       Final Diagnoses: Same as principal problem.    Disposition: Home or Self Care    Follow up/Patient Instructions:    Medications:  Reconciled Home Medications:   Current Discharge Medication List      START taking these medications    Details   oxyCODONE-acetaminophen (PERCOCET) 5-325 mg per tablet Take 1 tablet by mouth every 6 (six) hours as needed for Pain.  Qty: 5 tablet, Refills: 0    Comments: Quantity prescribed more than 7 day supply? No         CONTINUE these medications which have NOT CHANGED    Details   atorvastatin (LIPITOR) 40 MG tablet Take 40 mg by mouth.      cholecalciferol, vitamin D3, (VITAMIN D3) 1,000 unit capsule Vitamin D 1000 UNT Oral Tablet           Discharge Procedure Orders   Diet Adult Regular     No driving until:   Order Comments: Off narcotics     Notify your health care provider if you experience any of the following:  temperature >100.4     Notify your health care provider if you experience any of the following:  persistent nausea and vomiting or diarrhea     Notify your health care provider if you experience any of the following:  severe uncontrolled pain     Notify your health care provider if you experience any of the following:  redness, tenderness, or signs of infection (pain,  swelling, redness, odor or green/yellow discharge around incision site)     Notify your health care provider if you experience any of the following:  difficulty breathing or increased cough     Notify your health care provider if you experience any of the following:  severe persistent headache     Notify your health care provider if you experience any of the following:  worsening rash     Notify your health care provider if you experience any of the following:  persistent dizziness, light-headedness, or visual disturbances     Notify your health care provider if you experience any of the following:  increased confusion or weakness     Activity as tolerated     Follow-up Information     John Traore MD In 6 weeks.    Specialty: Urology  Why: Urology follow up  Contact information:  [address]  [facility] [id number] [phone]

## 2020-11-10 NOTE — ANESTHESIA PREPROCEDURE EVALUATION
11/10/2020  Naya Araujo is a 75 y.o., female.    Anesthesia Evaluation    I have reviewed the Patient Summary Reports.    I have reviewed the Nursing Notes. I have reviewed the NPO Status.   I have reviewed the Medications.     Review of Systems  Anesthesia Hx:  No problems with previous Anesthesia  History of prior surgery of interest to airway management or planning: Denies Family Hx of Anesthesia complications.   Denies Personal Hx of Anesthesia complications.   Social:  Non-Smoker    EENT/Dental:   Wears glasses/contacts   Cardiovascular:   Exercise tolerance: good Denies MI.  Denies CAD.    hyperlipidemia ECG has been reviewed. Weight  Training -x1-2x/week-uses walker   Pulmonary:  Pulmonary Normal  Denies COPD.  Denies Sleep Apnea.    Renal/:  Renal/ Normal  No recent UTI/Urgency incontinence   Hepatic/GI:  Hepatic/GI Normal    Musculoskeletal:  Musculoskeletal Normal    Neurological:  Neurology Normal Denies Seizures. Viral Encephalitis-2006   Endocrine:   Denies Diabetes.        Physical Exam  General:  Obesity    Airway/Jaw/Neck:  Airway Findings: Mouth Opening: Normal Tongue: Normal  General Airway Assessment: Adult  Jaw/Neck Findings:  Micrognathia: Negative Neck ROM: Normal ROM      Dental:  Dental Findings: In tact   Chest/Lungs:  Chest/Lungs Findings: Clear to auscultation, Normal Respiratory Rate     Heart/Vascular:  Heart Findings: Rate: Normal  Rhythm: Regular Rhythm  Sounds: Normal  Heart murmur: negative    Abdomen:  Abdomen Findings:  Normal, Nontender, Soft       Mental Status:  Mental Status Findings:  Cooperative, Alert and Oriented         Anesthesia Plan  Type of Anesthesia, risks & benefits discussed:  Anesthesia Type:  MAC, general  Patient's Preference:   Intra-op Monitoring Plan:   Intra-op Monitoring Plan Comments:   Post Op Pain Control Plan: multimodal analgesia, IV/PO  Opioids PRN and per primary service following discharge from PACU  Post Op Pain Control Plan Comments:   Induction:   IV  Beta Blocker:  Patient is not currently on a Beta-Blocker (No further documentation required).       Informed Consent: Patient understands risks and agrees with Anesthesia plan.  Questions answered. Anesthesia consent signed with patient.  ASA Score: 3     Day of Surgery Review of History & Physical:    H&P update referred to the surgeon.         Ready For Surgery From Anesthesia Perspective.

## 2020-11-10 NOTE — OP NOTE
Neuromodulation Operative Note    Preoperative Diagnosis:   1. Urinary frequency  2. Urinary urgency  3. Urgency incontinence    Postoperative Diagnosis:  1. Urinary frequency  2. Urinary urgency  3. Urgency incontinence    Procedure:  1. Removal and simultaneous replacement of new implantable pulse generator (50934)  2. Complex analysis and programming of implanted neurostimulator pulse generator (34259)    Attending Surgeon: John Traore MD    Assistant Surgeon: Trevor Jasmine MD    Anesthesia: Monitored Anesthesia Care    EBL: <10 mL    Complications: None    Findings:   1. IPG Placement: left    Reason for Procedure: Naya Araujo is a very pleasant 75 y.o. female with a history of frequency, urgency and urgency incontinence. She had an InterStim II device implanted about 10-11 years ago. This was placed in a staged fashion and she reported considerable improvement in her symptoms until about 3 years ago, when she had return of symptoms. She has not been able to communicate with the device. We believe that her IPG battery is depleted, so she presents today for IPG removal and replacement.    PROCEDURE IN DETAIL: Informed consent was obtained by explaining all risks and benefits of the procedure to the patient in detail. She was given appropriate perioperative antibiotics within 30 minutes prior to beginning the procedure. She was brought to the OR suite, where monitored anesthesia care was administered by anesthesia staff in a prone position. She was prepped and draped in accordance with our standard sacral neuromodulation infection protocol, which was utilized preoperatively, intraoperatively, and postoperatively.     The patient's scar from her previous InterStim II implant was visualized overlying her left buttock and her pulse generator was palpated deep to this scar. An approximately 3.75 cm incision was marked over her previous scar and anesthestized with local anesthetic. A #15 scalpel blade was  "used to open the incision and the IPG was delivered through the incision. The single setscrew was loosened on her IPG and the quadripolar lead was freed The IPG was removed and passed off the field. The quadripolar lead was seen to be intact. The pocket was irrigated extensively with sterile water. The quadripolar lead was then attached to the new InterStim II implantable pulse generator. The single setscrew was tightened until a single "click" was heard. The IPG was then placed within the pocket. After impedance testing demonstrated no abnormalities on all electrode pairs, the incision was closed in 2 layers, deeply with a 3-0 Vicryl and superficially with a running 4-0 Monocryl. The incision was covered with Dermabond. She tolerated the procedure well and was transferred in stable condition to the recovery room.     Once awake and alert, complex electronic analysis and programming of the SNM pulse generator was performed, including setting active contact groups, amplitude, pulse width, frequency, cycling, and lockout parameters.     POSTOPERATIVE PLAN: We will plan to see her back in 6-8 weeks for continued evaluation.    Trevor Jasmine MD  "

## 2020-11-10 NOTE — TRANSFER OF CARE
"Anesthesia Transfer of Care Note    Patient: Naya Araujo    Procedure(s) Performed: Procedure(s) (LRB):  REPLACEMENT, BATTERY, NEUROSTIMULATOR (Right)    Patient location: PACU    Anesthesia Type: general    Transport from OR: Transported from OR on 2-3 L/min O2 by NC with adequate spontaneous ventilation    Post pain: adequate analgesia    Post assessment: no apparent anesthetic complications and tolerated procedure well    Post vital signs: stable    Level of consciousness: awake and alert    Nausea/Vomiting: no nausea/vomiting    Complications: none    Transfer of care protocol was followed      Last vitals:   Visit Vitals  BP (!) 155/93 (BP Location: Right arm, Patient Position: Lying)   Pulse 67   Temp 36.9 °C (98.5 °F) (Oral)   Resp 17   Ht 5' 1" (1.549 m)   Wt 72.6 kg (160 lb)   SpO2 96%   Breastfeeding No   BMI 30.23 kg/m²     "

## 2020-11-10 NOTE — DISCHARGE INSTRUCTIONS
Interstim Post-Operative Care    Caring for Your Wound   After surgery you will have skin glue over your incision. This will peel off on its own after two weeks.    Post Surgical Pain Management   It is normal to experience some discomfort post operatively, however the stimulation should not  be painful.   Take Tylenol 650mg 1-2 tabs every 4-6 hours as needed if you feel tenderness from surgical  incision (Do not to exceed 4000mg daily) or Motrin 200mg 1-2 tabs every 4-6 hours.  Warning: Do not take additional Tylenol while taking Percocet or Vicodin. All of these  products contain Acetaminophen, which can be toxic if taken in excess.    Stimulation   Stimulation is the pulling or tingling sensation felt in the pelvic area (near the vagina, scrotum  or anal area.) It should not be painful. If it is painful or you feel the stimulation sensation move  down the legs decrease the stimulation and call the office and speak to a nurse.   During the trial period (stage 1) you may notice that the Interstim device has improved your  continence which means it is working and on the correct setting. If you are having episodes of  incontinence you will need to increase your device setting by moving the dial up slowly.    Activity   Avoid heavy lifting or strenuous activity for 14 days after your surgery.    You may take a shower the evening of your surgery.    Symptoms to Report   Severe or worsening pain, unrelieved by pain medications.   Fever, greater than 101.5ºF, chills or sweats   Painful or problems urinating   Any problems with the device    If you experience any of the above symptoms, call the [facility] clinic at [phone] during business hours on weekdays. If after hours or on weekends, call [phone] and ask to speak with the urology resident on call.    You may also call the [employer] Patient Help Line for specific help troubleshooting your device.  For help with the temporary implant  Call [phone].  For help with the permanent implant Call [phone].

## 2020-11-11 NOTE — ANESTHESIA POSTPROCEDURE EVALUATION
Anesthesia Post Evaluation    Patient: Naya Araujo    Procedure(s) Performed: Procedure(s) (LRB):  REPLACEMENT, BATTERY, NEUROSTIMULATOR (Right)    Final Anesthesia Type: MAC    Patient location during evaluation: PACU  Patient participation: Yes- Able to Participate  Level of consciousness: awake and alert  Post-procedure vital signs: reviewed and stable  Pain management: adequate  Airway patency: patent    PONV status at discharge: No PONV  Anesthetic complications: no      Cardiovascular status: stable  Respiratory status: unassisted and spontaneous ventilation  Hydration status: euvolemic  Follow-up not needed.          Vitals Value Taken Time   /69 11/10/20 0831   Temp  11/11/20 0629   Pulse 70 11/10/20 0843   Resp 17 11/10/20 0830   SpO2 78 % 11/10/20 0835   Vitals shown include unvalidated device data.      No case tracking events are documented in the log.      Pain/Dolores Score: Dolores Score: 10 (11/10/2020  8:30 AM)

## 2020-12-15 ENCOUNTER — PATIENT MESSAGE (OUTPATIENT)
Dept: UROLOGY | Facility: CLINIC | Age: 75
End: 2020-12-15

## 2022-04-10 ENCOUNTER — CLINICAL SUPPORT (OUTPATIENT)
Dept: URGENT CARE | Facility: CLINIC | Age: 77
End: 2022-04-10
Payer: MEDICARE

## 2022-04-10 PROCEDURE — 71045 X-RAY EXAM CHEST 1 VIEW: CPT | Mod: FY,S$GLB,ICN, | Performed by: RADIOLOGY

## 2022-04-10 PROCEDURE — 71045 XR CHEST 1 VIEW: ICD-10-PCS | Mod: FY,S$GLB,ICN, | Performed by: RADIOLOGY

## (undated) DEVICE — SEE MEDLINE ITEM 154981

## (undated) DEVICE — DRESSING TRANS 4X4 TEGADERM

## (undated) DEVICE — DURAPREP SURG SCRUB 26ML

## (undated) DEVICE — ELECTRODE REM PLYHSV RETURN 9

## (undated) DEVICE — BLADE SURG #15 CARBON STEEL

## (undated) DEVICE — SUT VICRYL 3-0 27 SH

## (undated) DEVICE — SCRUB HIBICLENS 4% CHG 4OZ

## (undated) DEVICE — DRAPE C-ARMOR EQUIPMENT COVER

## (undated) DEVICE — COVER PROBE NL STRL 3.6X96IN

## (undated) DEVICE — BATTERY SIZE 9V

## (undated) DEVICE — DRAPE STERI INSTRUMENT 1018

## (undated) DEVICE — SUT MONOCRYL 3-0 PS-2 UND

## (undated) DEVICE — SEE MEDLINE ITEM 152622

## (undated) DEVICE — COVER LIGHT HANDLE 80/CA

## (undated) DEVICE — TAPE SILK 3IN

## (undated) DEVICE — DRAPE CESAREAN IOBAN POUCH

## (undated) DEVICE — SUT MONOCRYL 3-0 SH U/D

## (undated) DEVICE — HANDSET INTERSTIM X COMM

## (undated) DEVICE — ADHESIVE DERMABOND ADVANCED

## (undated) DEVICE — TOWEL OR NONABSORB ADH 17X26

## (undated) DEVICE — TRAY MINOR GEN SURG

## (undated) DEVICE — DRAPE INCISE IOBAN 2 13X13IN

## (undated) DEVICE — BLADE SURG CARBON STEEL SZ11

## (undated) DEVICE — SOL WATER STRL IRR 1000ML

## (undated) DEVICE — DRESSING TELFA STRL 4X3 LF

## (undated) DEVICE — DRAPE C ARM 42 X 120 10/BX

## (undated) DEVICE — SUT MCRYL PLUS 4-0 PS2 27IN

## (undated) DEVICE — COVERS PROBE NR-48 STERILE